# Patient Record
Sex: MALE | URBAN - METROPOLITAN AREA
[De-identification: names, ages, dates, MRNs, and addresses within clinical notes are randomized per-mention and may not be internally consistent; named-entity substitution may affect disease eponyms.]

---

## 2022-11-27 ENCOUNTER — HOSPITAL ENCOUNTER (EMERGENCY)
Facility: HOSPITAL | Age: 42
Discharge: LEFT BEFORE DISCHARGE | End: 2022-11-27

## 2022-11-27 VITALS
BODY MASS INDEX: 19.22 KG/M2 | DIASTOLIC BLOOD PRESSURE: 79 MMHG | TEMPERATURE: 98 F | RESPIRATION RATE: 18 BRPM | WEIGHT: 145 LBS | OXYGEN SATURATION: 99 % | HEART RATE: 58 BPM | SYSTOLIC BLOOD PRESSURE: 135 MMHG | HEIGHT: 73 IN

## 2022-11-27 NOTE — ED INITIAL ASSESSMENT (HPI)
PT TO ER WITH RASH/BUMPS THAT STARTED ON HIS BUTTOCK AND HAVE MOVED TO HIS BACK AND THE UPPER PART OF BOTH OF HIS LEGS. PT HS SOME SCABBING TO THE RIGHT LEG. PT STATES HE RETURNED FROM [place] IN [place] AND THESE SYMPTOMS STARTED 3 DAYS AFTER. PT DENIES ANY GI SYMPTOMS, DENIES ANY FEVERS. VSS.